# Patient Record
Sex: MALE | Race: WHITE | HISPANIC OR LATINO | Employment: OTHER | URBAN - METROPOLITAN AREA
[De-identification: names, ages, dates, MRNs, and addresses within clinical notes are randomized per-mention and may not be internally consistent; named-entity substitution may affect disease eponyms.]

---

## 2024-06-11 ENCOUNTER — TELEPHONE (OUTPATIENT)
Dept: UROLOGY | Facility: CLINIC | Age: 79
End: 2024-06-11
Payer: COMMERCIAL

## 2024-06-11 NOTE — TELEPHONE ENCOUNTER
Action 2024 jtv 7:48 PM    Action Taken [facility] sent an urgent request to [facility] for images.     @ 04:13 PM, PATIENT GAVE NURSE KAVITA CASTELLANO TO UPDATE MEDICAL RECORDS.    MEDICAL RECORDS REQUEST   [facility]  [facility]  [address] [place] [id number]  PHONE: [phone]  Fax: [phone]        FUTURE VISIT INFORMATION                                                   Finesse Armendariz, : 1945 scheduled for future visit at [facility]    APPOINTMENT INFORMATION:  Date: 2024  Provider:  Shravan King MD  Reason for Visit/Diagnosis: PROSTATE CANCER      RECORDS REQUESTED FOR VISIT                                                     NOTES  STATUS/DETAILS   OFFICE NOTE from other specialist  yes, 04/15/2024 -- Francisco Omer MD @ [facility] FAMILY MEDICINE  2024, 2023 -- Cecy Orozco MD @ [facility]  2024 -- Renato Jennings MD @ [facility] RADIATION ONC    MORE   OPERATIVE REPORT  YES   MEDICATION LIST  YES   LABS     PSA  yes   IMAGES  yes, [facility]    2023 -- [place] PET CT   2023 -- MRI PELVIS     PRE-VISIT CHECKLIST      Joint diagnostic appointment coordinated correctly          (ensure right order & amount of time) Yes   RECORD COLLECTION COMPLETE YES

## 2024-06-11 NOTE — TELEPHONE ENCOUNTER
Writer reached out to patient, following direct referral to Dr King, patient scheduled for new patient appt for prostate cancer for 6/19/24, requested records be requested from [facility] in [place],     [facility] link sent to patient    Thank you,  Krystal Frias,BSN RN-Triage-Urology

## 2024-06-19 ENCOUNTER — PRE VISIT (OUTPATIENT)
Dept: UROLOGY | Facility: CLINIC | Age: 79
End: 2024-06-19